# Patient Record
Sex: FEMALE | Race: WHITE | URBAN - METROPOLITAN AREA
[De-identification: names, ages, dates, MRNs, and addresses within clinical notes are randomized per-mention and may not be internally consistent; named-entity substitution may affect disease eponyms.]

---

## 2019-12-09 ENCOUNTER — OFFICE VISIT (OUTPATIENT)
Dept: URBAN - METROPOLITAN AREA CLINIC 33 | Facility: CLINIC | Age: 29
End: 2019-12-09

## 2019-12-09 DIAGNOSIS — H15.111 EPISCLERITIS PERIODICA FUGAX, RIGHT EYE: Primary | ICD-10-CM

## 2019-12-09 PROCEDURE — 99203 OFFICE O/P NEW LOW 30 MIN: CPT | Performed by: OPTOMETRIST

## 2019-12-09 RX ORDER — PREDNISOLONE ACETATE 10 MG/ML
1 % SUSPENSION/ DROPS OPHTHALMIC
Qty: 1 | Refills: 1 | Status: ACTIVE
Start: 2019-12-09

## 2019-12-09 ASSESSMENT — INTRAOCULAR PRESSURE
OS: 8
OD: 9

## 2019-12-09 NOTE — IMPRESSION/PLAN
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 Plan: 1-2 weeks with right eye pain and redness. Start Pred QID OD and Ibuprofen 200mg PO QID. Patient reports history of stomach pain but no diagnosis. Consider referral for medical consult and possible blood work for inflammation.  RTC 2 weeks for f/u

## 2020-01-06 ENCOUNTER — OFFICE VISIT (OUTPATIENT)
Dept: URBAN - METROPOLITAN AREA CLINIC 33 | Facility: CLINIC | Age: 30
End: 2020-01-06

## 2020-01-06 DIAGNOSIS — H15.113 EPISCLERITIS PERIODICA FUGAX, BILATERAL: Primary | ICD-10-CM

## 2020-01-06 PROCEDURE — 99213 OFFICE O/P EST LOW 20 MIN: CPT | Performed by: OPTOMETRIST

## 2020-01-06 ASSESSMENT — INTRAOCULAR PRESSURE
OD: 10
OS: 10

## 2020-01-06 NOTE — IMPRESSION/PLAN
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 Plan: Episcleritis bilateral. Patient has been using Pred QID OD with no relief. Patient has an appointment for blood work coming soon. Recommend patient discontinue drops and increase Ibuprofen 200mg from QD to QID PO.